# Patient Record
Sex: MALE | Race: WHITE
[De-identification: names, ages, dates, MRNs, and addresses within clinical notes are randomized per-mention and may not be internally consistent; named-entity substitution may affect disease eponyms.]

---

## 2023-02-10 ENCOUNTER — HOSPITAL ENCOUNTER (EMERGENCY)
Dept: HOSPITAL 74 - FER | Age: 53
Discharge: LEFT BEFORE DISCHARGE | End: 2023-02-10
Payer: COMMERCIAL

## 2023-02-10 VITALS — HEART RATE: 89 BPM | SYSTOLIC BLOOD PRESSURE: 143 MMHG | DIASTOLIC BLOOD PRESSURE: 89 MMHG | RESPIRATION RATE: 18 BRPM

## 2023-02-10 VITALS — TEMPERATURE: 98.2 F

## 2023-02-10 VITALS — BODY MASS INDEX: 39.7 KG/M2

## 2023-02-10 DIAGNOSIS — M54.2: Primary | ICD-10-CM

## 2023-02-17 PROBLEM — Z00.00 ENCOUNTER FOR PREVENTIVE HEALTH EXAMINATION: Status: ACTIVE | Noted: 2023-02-17

## 2023-02-20 ENCOUNTER — NON-APPOINTMENT (OUTPATIENT)
Age: 53
End: 2023-02-20

## 2023-02-23 ENCOUNTER — NON-APPOINTMENT (OUTPATIENT)
Age: 53
End: 2023-02-23

## 2023-02-23 ENCOUNTER — APPOINTMENT (OUTPATIENT)
Dept: SPINE | Facility: CLINIC | Age: 53
End: 2023-02-23
Payer: OTHER MISCELLANEOUS

## 2023-02-23 VITALS
HEART RATE: 85 BPM | BODY MASS INDEX: 39.82 KG/M2 | HEIGHT: 65 IN | DIASTOLIC BLOOD PRESSURE: 120 MMHG | OXYGEN SATURATION: 95 % | WEIGHT: 239 LBS | SYSTOLIC BLOOD PRESSURE: 169 MMHG

## 2023-02-23 DIAGNOSIS — Z87.19 PERSONAL HISTORY OF OTHER DISEASES OF THE DIGESTIVE SYSTEM: ICD-10-CM

## 2023-02-23 DIAGNOSIS — Z78.9 OTHER SPECIFIED HEALTH STATUS: ICD-10-CM

## 2023-02-23 DIAGNOSIS — M50.20 OTHER CERVICAL DISC DISPLACEMENT, UNSPECIFIED CERVICAL REGION: ICD-10-CM

## 2023-02-23 DIAGNOSIS — Z83.3 FAMILY HISTORY OF DIABETES MELLITUS: ICD-10-CM

## 2023-02-23 DIAGNOSIS — R73.03 PREDIABETES.: ICD-10-CM

## 2023-02-23 PROCEDURE — 99204 OFFICE O/P NEW MOD 45 MIN: CPT

## 2023-02-23 PROCEDURE — 99072 ADDL SUPL MATRL&STAF TM PHE: CPT

## 2023-02-23 RX ORDER — SEMAGLUTIDE 0.68 MG/ML
INJECTION, SOLUTION SUBCUTANEOUS
Refills: 0 | Status: ACTIVE | COMMUNITY

## 2023-02-23 RX ORDER — GABAPENTIN 300 MG/1
300 CAPSULE ORAL
Qty: 30 | Refills: 1 | Status: ACTIVE | COMMUNITY
Start: 2023-02-23 | End: 1900-01-01

## 2023-02-23 RX ORDER — ACETAMINOPHEN 500 MG/1
500 TABLET, COATED ORAL
Refills: 0 | Status: ACTIVE | COMMUNITY

## 2023-02-23 RX ORDER — TIZANIDINE 2 MG/1
2 TABLET ORAL
Refills: 0 | Status: ACTIVE | COMMUNITY

## 2023-02-23 RX ORDER — MELOXICAM 15 MG/1
15 TABLET ORAL
Refills: 0 | Status: ACTIVE | COMMUNITY

## 2023-02-25 PROBLEM — Z87.19 HISTORY OF HEPATIC DISEASE: Status: RESOLVED | Noted: 2023-02-23 | Resolved: 2023-02-25

## 2023-02-25 PROBLEM — Z78.9 CURRENT NON-SMOKER: Status: ACTIVE | Noted: 2023-02-23

## 2023-02-25 PROBLEM — Z83.3 FAMILY HISTORY OF DIABETES MELLITUS: Status: ACTIVE | Noted: 2023-02-23

## 2023-02-25 PROBLEM — R73.03 PRE-DIABETES: Status: RESOLVED | Noted: 2023-02-23 | Resolved: 2023-02-25

## 2023-02-25 NOTE — HISTORY OF PRESENT ILLNESS
[< 3 months] : less than 3 months [FreeTextEntry1] : neck pain with radiation into right arm [de-identified] : Mr. Dunbar is a 52 year old gentleman with PMH of prediabetes here today for a second opinion. The patient works as a  and states on 1/17/2023 he was moving an 80 pound bag of cement and placed it on his right shoulder. He immediately begun experiencing right arm pain. The pain progressed to neck pain, right  weakness and numbness.  He has done one month of physical therapy with no improvement. Pain level is 9/10. He is taking meloxicam daily, Tylenol as needed and Tizanidine with minimal relief. Denies any trouble with his balance. MRI show large C5-6 disc herniation and smaller C4-5 disc herniation with significant cord compression. Possible osteophyte formation . \par

## 2023-02-25 NOTE — REASON FOR VISIT
[New Patient Visit] : a new patient visit [Spouse] : spouse [FreeTextEntry1] : neck pain with radiation into right arm

## 2023-02-25 NOTE — ASSESSMENT
[FreeTextEntry1] : 52 year old male with cervical radiculopathy and right upper extremity weakness following a work related injury 5 weeks ago. Right bicep and  weakness noted on exam. C4-5 and C5-6 TDR with possible fusion was discussed in detail. Surgery was a result of shared decision making, the patient had no benefit from conservative- non surgical treatment, and all surgical options were reviewed. Risks related of surgery were discussed in detail - including but not limited to CSF leak, infection, nerve damage, temporary or permanent weakness or numbness, hemorrhage, cardiac events, and rarely even death, among others. Repair of CSF leak explained. An explanation of hardware/instrumentation that will be placed in the spine was reviewed and understood. Cervical CT Scan and Cervical flexion and extension scan ordered for surgical planing. Surgery and images authorization will be requested from [facility].\par \par \par I, Dr. Chava Garcia, evaluated this patient with Nurse Practitioner, Zhanna Reynolds, and established the plan of care. I personally discussed this patient during the key portions of the history and exam with the Nurse Practitioner at the time of the visit. I agree with the assessment and plan as written, unless noted below.

## 2023-02-25 NOTE — PHYSICAL EXAM
[Person] : oriented to person [Place] : oriented to place [Time] : oriented to time [Short Term Intact] : short term memory intact [Remote Intact] : remote memory intact [Span Intact] : the attention span was normal [Concentration Intact] : normal concentrating ability [Fluency] : fluency intact [Comprehension] : comprehension intact [Current Events] : adequate knowledge of current events [Past History] : adequate knowledge of personal past history [Vocabulary] : adequate range of vocabulary [Cranial Nerves Optic (II)] : visual acuity intact bilaterally,  pupils equal round and reactive to light [Cranial Nerves Oculomotor (III)] : extraocular motion intact [Cranial Nerves Trigeminal (V)] : facial sensation intact symmetrically [Cranial Nerves Facial (VII)] : face symmetrical [Cranial Nerves Vestibulocochlear (VIII)] : hearing was intact bilaterally [Cranial Nerves Glossopharyngeal (IX)] : tongue and palate midline [Cranial Nerves Accessory (XI - Cranial And Spinal)] : head turning and shoulder shrug symmetric [Cranial Nerves Hypoglossal (XII)] : there was no tongue deviation with protrusion [Motor Tone] : muscle tone was normal in all four extremities [No Muscle Atrophy] : normal bulk in all four extremities [Abnormal Walk] : normal gait [Balance] : balance was intact [2+] : Triceps left 2+ [3+] : Ankle jerk left 3+ [Spurling's - Opposite Side] : Positive Spurling's on opposite side [Spurling's Same Side] : Positive Spurling's on same side [Past-pointing] : there was no past-pointing [Tremor] : no tremor present [Plantar Reflex Right Only] : normal on the right [Plantar Reflex Left Only] : normal on the left [Kaur] : Kaur's sign was not demonstrated [FreeTextEntry6] : right bicep and  4/5 [FreeTextEntry7] : decrease light touch C6 right [L'Hermitte's] : neck flexion did not produce tingling down the spine/arms

## 2023-03-01 ENCOUNTER — APPOINTMENT (OUTPATIENT)
Dept: SPINE | Facility: HOSPITAL | Age: 53
End: 2023-03-01

## 2023-03-06 ENCOUNTER — APPOINTMENT (OUTPATIENT)
Dept: CT IMAGING | Facility: IMAGING CENTER | Age: 53
End: 2023-03-06
Payer: OTHER MISCELLANEOUS

## 2023-03-06 ENCOUNTER — OUTPATIENT (OUTPATIENT)
Dept: OUTPATIENT SERVICES | Facility: HOSPITAL | Age: 53
LOS: 1 days | End: 2023-03-06
Payer: COMMERCIAL

## 2023-03-06 DIAGNOSIS — M47.812 SPONDYLOSIS WITHOUT MYELOPATHY OR RADICULOPATHY, CERVICAL REGION: ICD-10-CM

## 2023-03-06 PROCEDURE — 72125 CT NECK SPINE W/O DYE: CPT | Mod: 26

## 2023-03-06 PROCEDURE — 72125 CT NECK SPINE W/O DYE: CPT

## 2023-03-22 ENCOUNTER — OUTPATIENT (OUTPATIENT)
Dept: OUTPATIENT SERVICES | Facility: HOSPITAL | Age: 53
LOS: 1 days | End: 2023-03-22
Payer: COMMERCIAL

## 2023-03-22 VITALS
HEIGHT: 65 IN | SYSTOLIC BLOOD PRESSURE: 134 MMHG | RESPIRATION RATE: 16 BRPM | HEART RATE: 96 BPM | OXYGEN SATURATION: 99 % | WEIGHT: 231.93 LBS | DIASTOLIC BLOOD PRESSURE: 97 MMHG | TEMPERATURE: 98 F

## 2023-03-22 DIAGNOSIS — Z29.9 ENCOUNTER FOR PROPHYLACTIC MEASURES, UNSPECIFIED: ICD-10-CM

## 2023-03-22 DIAGNOSIS — Z01.818 ENCOUNTER FOR OTHER PREPROCEDURAL EXAMINATION: ICD-10-CM

## 2023-03-22 DIAGNOSIS — Z98.890 OTHER SPECIFIED POSTPROCEDURAL STATES: Chronic | ICD-10-CM

## 2023-03-22 DIAGNOSIS — M47.812 SPONDYLOSIS WITHOUT MYELOPATHY OR RADICULOPATHY, CERVICAL REGION: ICD-10-CM

## 2023-03-22 DIAGNOSIS — G47.33 OBSTRUCTIVE SLEEP APNEA (ADULT) (PEDIATRIC): ICD-10-CM

## 2023-03-22 LAB
ANION GAP SERPL CALC-SCNC: 11 MMOL/L — SIGNIFICANT CHANGE UP (ref 5–17)
BLD GP AB SCN SERPL QL: NEGATIVE — SIGNIFICANT CHANGE UP
BUN SERPL-MCNC: 19 MG/DL — SIGNIFICANT CHANGE UP (ref 7–23)
CALCIUM SERPL-MCNC: 9.7 MG/DL — SIGNIFICANT CHANGE UP (ref 8.4–10.5)
CHLORIDE SERPL-SCNC: 103 MMOL/L — SIGNIFICANT CHANGE UP (ref 96–108)
CO2 SERPL-SCNC: 26 MMOL/L — SIGNIFICANT CHANGE UP (ref 22–31)
CREAT SERPL-MCNC: 0.91 MG/DL — SIGNIFICANT CHANGE UP (ref 0.5–1.3)
EGFR: 101 ML/MIN/1.73M2 — SIGNIFICANT CHANGE UP
GLUCOSE SERPL-MCNC: 79 MG/DL — SIGNIFICANT CHANGE UP (ref 70–99)
HCT VFR BLD CALC: 47.5 % — SIGNIFICANT CHANGE UP (ref 39–50)
HGB BLD-MCNC: 15.6 G/DL — SIGNIFICANT CHANGE UP (ref 13–17)
MCHC RBC-ENTMCNC: 30.8 PG — SIGNIFICANT CHANGE UP (ref 27–34)
MCHC RBC-ENTMCNC: 32.8 GM/DL — SIGNIFICANT CHANGE UP (ref 32–36)
MCV RBC AUTO: 93.9 FL — SIGNIFICANT CHANGE UP (ref 80–100)
NRBC # BLD: 0 /100 WBCS — SIGNIFICANT CHANGE UP (ref 0–0)
PLATELET # BLD AUTO: 237 K/UL — SIGNIFICANT CHANGE UP (ref 150–400)
POTASSIUM SERPL-MCNC: 4.2 MMOL/L — SIGNIFICANT CHANGE UP (ref 3.5–5.3)
POTASSIUM SERPL-SCNC: 4.2 MMOL/L — SIGNIFICANT CHANGE UP (ref 3.5–5.3)
RBC # BLD: 5.06 M/UL — SIGNIFICANT CHANGE UP (ref 4.2–5.8)
RBC # FLD: 13.6 % — SIGNIFICANT CHANGE UP (ref 10.3–14.5)
RH IG SCN BLD-IMP: POSITIVE — SIGNIFICANT CHANGE UP
SODIUM SERPL-SCNC: 140 MMOL/L — SIGNIFICANT CHANGE UP (ref 135–145)
WBC # BLD: 10.53 K/UL — HIGH (ref 3.8–10.5)
WBC # FLD AUTO: 10.53 K/UL — HIGH (ref 3.8–10.5)

## 2023-03-22 PROCEDURE — 83036 HEMOGLOBIN GLYCOSYLATED A1C: CPT

## 2023-03-22 PROCEDURE — 80048 BASIC METABOLIC PNL TOTAL CA: CPT

## 2023-03-22 PROCEDURE — 86901 BLOOD TYPING SEROLOGIC RH(D): CPT

## 2023-03-22 PROCEDURE — 86900 BLOOD TYPING SEROLOGIC ABO: CPT

## 2023-03-22 PROCEDURE — G0463: CPT

## 2023-03-22 PROCEDURE — 86850 RBC ANTIBODY SCREEN: CPT

## 2023-03-22 PROCEDURE — 87640 STAPH A DNA AMP PROBE: CPT

## 2023-03-22 PROCEDURE — 85027 COMPLETE CBC AUTOMATED: CPT

## 2023-03-22 PROCEDURE — 87641 MR-STAPH DNA AMP PROBE: CPT

## 2023-03-22 RX ORDER — CHLORHEXIDINE GLUCONATE 213 G/1000ML
1 SOLUTION TOPICAL ONCE
Refills: 0 | Status: DISCONTINUED | OUTPATIENT
Start: 2023-03-27 | End: 2023-03-29

## 2023-03-22 NOTE — H&P PST ADULT - NEGATIVE NEUROLOGICAL SYMPTOMS
no generalized seizures/no focal seizures/no tremors no generalized seizures/no focal seizures/no tremors/no loss of sensation/no difficulty walking/no loss of consciousness

## 2023-03-22 NOTE — H&P PST ADULT - ASSESSMENT
DASI score:  DASI activity:  Loose teeth or denture: removable partial dentures (upper & lower)    GIANFRANCOI VTE 2.0 SCORE [CLOT updated 2019]    AGE RELATED RISK FACTORS                                                       MOBILITY RELATED FACTORS  [ ] Age 41-60 years                                            (1 Point)                    [ ] Bed rest                                                        (1 Point)  [ ] Age: 61-74 years                                           (2 Points)                  [ ] Plaster cast                                                   (2 Points)  [ ] Age= 75 years                                              (3 Points)                    [ ] Bed bound for more than 72 hours                 (2 Points)    DISEASE RELATED RISK FACTORS                                               GENDER SPECIFIC FACTORS  [ ] Edema in the lower extremities                       (1 Point)              [ ] Pregnancy                                                     (1 Point)  [ ] Varicose veins                                               (1 Point)                     [ ] Post-partum < 6 weeks                                   (1 Point)             [ ] BMI > 25 Kg/m2                                            (1 Point)                     [ ] Hormonal therapy  or oral contraception          (1 Point)                 [ ] Sepsis (in the previous month)                        (1 Point)               [ ] History of pregnancy complications                 (1 point)  [ ] Pneumonia or serious lung disease                                               [ ] Unexplained or recurrent                     (1 Point)           (in the previous month)                               (1 Point)  [ ] Abnormal pulmonary function test                     (1 Point)                 SURGERY RELATED RISK FACTORS  [ ] Acute myocardial infarction                              (1 Point)               [ ]  Section                                             (1 Point)  [ ] Congestive heart failure (in the previous month)  (1 Point)      [ ] Minor surgery                                                  (1 Point)   [ ] Inflammatory bowel disease                             (1 Point)               [ ] Arthroscopic surgery                                        (2 Points)  [ ] Central venous access                                      (2 Points)                [ ] General surgery lasting more than 45 minutes (2 points)  [ ] Malignancy- Present or previous                   (2 Points)                [ ] Elective arthroplasty                                         (5 points)    [ ] Stroke (in the previous month)                          (5 Points)                                                                                                                                                           HEMATOLOGY RELATED FACTORS                                                 TRAUMA RELATED RISK FACTORS  [ ] Prior episodes of VTE                                     (3 Points)                [ ] Fracture of the hip, pelvis, or leg                       (5 Points)  [ ] Positive family history for VTE                         (3 Points)             [ ] Acute spinal cord injury (in the previous month)  (5 Points)  [ ] Prothrombin 56927 A                                     (3 Points)               [ ] Paralysis  (less than 1 month)                             (5 Points)  [ ] Factor V Leiden                                             (3 Points)                  [ ] Multiple Trauma within 1 month                        (5 Points)  [ ] Lupus anticoagulants                                     (3 Points)                                                           [ ] Anticardiolipin antibodies                               (3 Points)                                                       [ ] High homocysteine in the blood                      (3 Points)                                             [ ] Other congenital or acquired thrombophilia      (3 Points)                                                [ ] Heparin induced thrombocytopenia                  (3 Points)                                     Total Score [          ] DASI score: 6.45 mets  DASI activity: ADLs, walking, stairs, neck pain and right arm/leg numbness are limiting activity d/t discomfort  Loose teeth or denture: removable partial dentures (upper & lower), denies loose teeth    CAPRINI VTE 2.0 SCORE [CLOT updated 2019]    AGE RELATED RISK FACTORS                                                       MOBILITY RELATED FACTORS  [1 ] Age 41-60 years                                            (1 Point)                    [ ] Bed rest                                                        (1 Point)  [ ] Age: 61-74 years                                           (2 Points)                  [ ] Plaster cast                                                   (2 Points)  [ ] Age= 75 years                                              (3 Points)                    [ ] Bed bound for more than 72 hours                 (2 Points)    DISEASE RELATED RISK FACTORS                                               GENDER SPECIFIC FACTORS  [ ] Edema in the lower extremities                       (1 Point)              [ ] Pregnancy                                                     (1 Point)  [ ] Varicose veins                                               (1 Point)                     [ ] Post-partum < 6 weeks                                   (1 Point)             [1 ] BMI > 25 Kg/m2                                            (1 Point)                     [ ] Hormonal therapy  or oral contraception          (1 Point)                 [ ] Sepsis (in the previous month)                        (1 Point)               [ ] History of pregnancy complications                 (1 point)  [ ] Pneumonia or serious lung disease                                               [ ] Unexplained or recurrent                     (1 Point)           (in the previous month)                               (1 Point)  [ ] Abnormal pulmonary function test                     (1 Point)                 SURGERY RELATED RISK FACTORS  [ ] Acute myocardial infarction                              (1 Point)               [ ]  Section                                             (1 Point)  [ ] Congestive heart failure (in the previous month)  (1 Point)      [ ] Minor surgery                                                  (1 Point)   [ ] Inflammatory bowel disease                             (1 Point)               [ ] Arthroscopic surgery                                        (2 Points)  [ ] Central venous access                                      (2 Points)                [2 ] General surgery lasting more than 45 minutes (2 points)  [ ] Malignancy- Present or previous                   (2 Points)                [ ] Elective arthroplasty                                         (5 points)    [ ] Stroke (in the previous month)                          (5 Points)                                                                                                                                                           HEMATOLOGY RELATED FACTORS                                                 TRAUMA RELATED RISK FACTORS  [ ] Prior episodes of VTE                                     (3 Points)                [ ] Fracture of the hip, pelvis, or leg                       (5 Points)  [ ] Positive family history for VTE                         (3 Points)             [ ] Acute spinal cord injury (in the previous month)  (5 Points)  [ ] Prothrombin 56390 A                                     (3 Points)               [ ] Paralysis  (less than 1 month)                             (5 Points)  [ ] Factor V Leiden                                             (3 Points)                  [ ] Multiple Trauma within 1 month                        (5 Points)  [ ] Lupus anticoagulants                                     (3 Points)                                                           [ ] Anticardiolipin antibodies                               (3 Points)                                                       [ ] High homocysteine in the blood                      (3 Points)                                             [ ] Other congenital or acquired thrombophilia      (3 Points)                                                [ ] Heparin induced thrombocytopenia                  (3 Points)                                     Total Score [      4    ]

## 2023-03-22 NOTE — H&P PST ADULT - NSICDXPASTMEDICALHX_GEN_ALL_CORE_FT
PAST MEDICAL HISTORY:  2019 novel coronavirus disease (COVID-19)     Cervical disc herniation     H/O chronic hepatitis     History of prediabetes     HTN (hypertension)     Mild asthma      PAST MEDICAL HISTORY:  2019 novel coronavirus disease (COVID-19)     Cervical disc herniation     H/O chronic hepatitis     History of prediabetes     HTN (hypertension)     Mild asthma     DANIELA (obstructive sleep apnea)

## 2023-03-22 NOTE — H&P PST ADULT - PROBLEM SELECTOR PLAN 1
C4-C8 ANTERIOR CERVICAL DISCECTOMY AND FUSION  Pre-op education provided - all questions answered   Chlorhex soap & instructions given  CBC, BMP, Ha1c, T&S, MRSA/MSSA swab sent in PST

## 2023-03-22 NOTE — H&P PST ADULT - HISTORY OF PRESENT ILLNESS
1/17/2023  53 YO M PMH prediabetes, newly diagnosed HTN (recently started on daily metoprolol), asthma (has not used rescue inhaler for >1 year), chronic Hep C (treated 20+ years ago), pt works as a  and states on 1/17/2023 he was moving an 80 pound bag of cement and placed it on his right shoulder and immediately began experiencing right arm pain that progressed to neck pain, right  weakness and numbness, MRI revealed large C5-6 disc herniation and smaller C4-5 disc herniation with significant cord compression, possible osteophyte formation, presents to PST for C4-C8 ANTERIOR CERVICAL DISCECTOMY AND FUSION 3/27/2023. Denies any palpitations, SOB, N/V, fever or chills.      ***Covid test scheduled for 3/24/2023 at [facility]. 51 YO M PMH prediabetes, newly diagnosed HTN (recently started on daily metoprolol), asthma (has not used rescue inhaler for >1 year), DANIELA (by criteria, Stop Bang 5), chronic Hep C (treated 20+ years ago), pt works as a  and states on 1/17/2023 he was moving an 80 pound bag of cement and placed it on his right shoulder and immediately began experiencing right arm pain that progressed to neck pain, right  weakness and numbness, MRI revealed large C5-6 disc herniation and smaller C4-5 disc herniation with significant cord compression, possible osteophyte formation, presents to PST for C4-C8 ANTERIOR CERVICAL DISCECTOMY AND FUSION 3/27/2023. Denies any palpitations, SOB, N/V, fever or chills.      ***Covid test scheduled for 3/24/2023 at [facility].

## 2023-03-23 LAB
A1C WITH ESTIMATED AVERAGE GLUCOSE RESULT: 5.8 % — HIGH (ref 4–5.6)
ESTIMATED AVERAGE GLUCOSE: 120 MG/DL — HIGH (ref 68–114)
MRSA PCR RESULT.: SIGNIFICANT CHANGE UP
S AUREUS DNA NOSE QL NAA+PROBE: SIGNIFICANT CHANGE UP

## 2023-03-24 ENCOUNTER — OUTPATIENT (OUTPATIENT)
Dept: OUTPATIENT SERVICES | Facility: HOSPITAL | Age: 53
LOS: 1 days | End: 2023-03-24
Payer: COMMERCIAL

## 2023-03-24 DIAGNOSIS — Z98.890 OTHER SPECIFIED POSTPROCEDURAL STATES: Chronic | ICD-10-CM

## 2023-03-24 DIAGNOSIS — Z11.52 ENCOUNTER FOR SCREENING FOR COVID-19: ICD-10-CM

## 2023-03-24 LAB — SARS-COV-2 RNA SPEC QL NAA+PROBE: SIGNIFICANT CHANGE UP

## 2023-03-24 PROCEDURE — U0003: CPT

## 2023-03-24 PROCEDURE — C9803: CPT

## 2023-03-24 PROCEDURE — U0005: CPT

## 2023-03-26 ENCOUNTER — TRANSCRIPTION ENCOUNTER (OUTPATIENT)
Age: 53
End: 2023-03-26

## 2023-03-27 ENCOUNTER — TRANSCRIPTION ENCOUNTER (OUTPATIENT)
Age: 53
End: 2023-03-27

## 2023-03-27 ENCOUNTER — INPATIENT (INPATIENT)
Facility: HOSPITAL | Age: 53
LOS: 1 days | Discharge: HOME | DRG: 472 | End: 2023-03-29
Attending: NEUROLOGICAL SURGERY | Admitting: NEUROLOGICAL SURGERY
Payer: COMMERCIAL

## 2023-03-27 VITALS
DIASTOLIC BLOOD PRESSURE: 83 MMHG | RESPIRATION RATE: 16 BRPM | OXYGEN SATURATION: 97 % | HEIGHT: 65 IN | TEMPERATURE: 98 F | SYSTOLIC BLOOD PRESSURE: 122 MMHG | WEIGHT: 231.93 LBS | HEART RATE: 82 BPM

## 2023-03-27 DIAGNOSIS — Z98.890 OTHER SPECIFIED POSTPROCEDURAL STATES: Chronic | ICD-10-CM

## 2023-03-27 DIAGNOSIS — M47.812 SPONDYLOSIS WITHOUT MYELOPATHY OR RADICULOPATHY, CERVICAL REGION: ICD-10-CM

## 2023-03-27 LAB
GAS PNL BLDA: SIGNIFICANT CHANGE UP
GLUCOSE BLDC GLUCOMTR-MCNC: 107 MG/DL — HIGH (ref 70–99)
GLUCOSE BLDC GLUCOMTR-MCNC: 119 MG/DL — HIGH (ref 70–99)
GLUCOSE BLDC GLUCOMTR-MCNC: 146 MG/DL — HIGH (ref 70–99)
RH IG SCN BLD-IMP: POSITIVE — SIGNIFICANT CHANGE UP

## 2023-03-27 DEVICE — LIGATING CLIPS WECK HEMOCLIP TITANIUM MEDIUM (BLUE) 25: Type: IMPLANTABLE DEVICE | Status: FUNCTIONAL

## 2023-03-27 DEVICE — KIT A-LINE 1LUM 20G X 12CM SAFE KIT: Type: IMPLANTABLE DEVICE | Status: FUNCTIONAL

## 2023-03-27 DEVICE — CAGE TRITANIUM C ANT 6 DEG 5X14X17MM: Type: IMPLANTABLE DEVICE | Status: FUNCTIONAL

## 2023-03-27 DEVICE — CAGE TRITANIUM C ANT CERV 6 DEG 7X14X17MM: Type: IMPLANTABLE DEVICE | Status: FUNCTIONAL

## 2023-03-27 DEVICE — SCREW VAR SELF TAP 4X14MM: Type: IMPLANTABLE DEVICE | Status: FUNCTIONAL

## 2023-03-27 DEVICE — PIN PRE-FIXATION: Type: IMPLANTABLE DEVICE | Status: FUNCTIONAL

## 2023-03-27 DEVICE — IMPLANTABLE DEVICE: Type: IMPLANTABLE DEVICE | Status: FUNCTIONAL

## 2023-03-27 DEVICE — SURGIFLO MATRIX WITH THROMBIN KIT: Type: IMPLANTABLE DEVICE | Status: FUNCTIONAL

## 2023-03-27 DEVICE — SURGIFOAM PAD 8CM X 12.5CM X 10MM (100): Type: IMPLANTABLE DEVICE | Status: FUNCTIONAL

## 2023-03-27 RX ORDER — METOPROLOL TARTRATE 50 MG
1 TABLET ORAL
Qty: 0 | Refills: 0 | DISCHARGE

## 2023-03-27 RX ORDER — OXYCODONE HYDROCHLORIDE 5 MG/1
5 TABLET ORAL ONCE
Refills: 0 | Status: DISCONTINUED | OUTPATIENT
Start: 2023-03-27 | End: 2023-03-27

## 2023-03-27 RX ORDER — DEXAMETHASONE 0.5 MG/5ML
4 ELIXIR ORAL EVERY 6 HOURS
Refills: 0 | Status: DISCONTINUED | OUTPATIENT
Start: 2023-03-27 | End: 2023-03-28

## 2023-03-27 RX ORDER — ACETAMINOPHEN 500 MG
1000 TABLET ORAL ONCE
Refills: 0 | Status: DISCONTINUED | OUTPATIENT
Start: 2023-03-27 | End: 2023-03-27

## 2023-03-27 RX ORDER — DEXTROSE 50 % IN WATER 50 %
25 SYRINGE (ML) INTRAVENOUS ONCE
Refills: 0 | Status: DISCONTINUED | OUTPATIENT
Start: 2023-03-27 | End: 2023-03-29

## 2023-03-27 RX ORDER — SODIUM CHLORIDE 9 MG/ML
1000 INJECTION, SOLUTION INTRAVENOUS
Refills: 0 | Status: DISCONTINUED | OUTPATIENT
Start: 2023-03-27 | End: 2023-03-29

## 2023-03-27 RX ORDER — LIDOCAINE HCL 20 MG/ML
0.2 VIAL (ML) INJECTION ONCE
Refills: 0 | Status: DISCONTINUED | OUTPATIENT
Start: 2023-03-27 | End: 2023-03-27

## 2023-03-27 RX ORDER — METOPROLOL TARTRATE 50 MG
5 TABLET ORAL ONCE
Refills: 0 | Status: COMPLETED | OUTPATIENT
Start: 2023-03-27 | End: 2023-03-27

## 2023-03-27 RX ORDER — HYDROMORPHONE HYDROCHLORIDE 2 MG/ML
0.5 INJECTION INTRAMUSCULAR; INTRAVENOUS; SUBCUTANEOUS
Refills: 0 | Status: DISCONTINUED | OUTPATIENT
Start: 2023-03-27 | End: 2023-03-27

## 2023-03-27 RX ORDER — ALBUTEROL 90 UG/1
2 AEROSOL, METERED ORAL EVERY 6 HOURS
Refills: 0 | Status: DISCONTINUED | OUTPATIENT
Start: 2023-03-27 | End: 2023-03-29

## 2023-03-27 RX ORDER — ENOXAPARIN SODIUM 100 MG/ML
40 INJECTION SUBCUTANEOUS EVERY 24 HOURS
Refills: 0 | Status: DISCONTINUED | OUTPATIENT
Start: 2023-03-28 | End: 2023-03-29

## 2023-03-27 RX ORDER — SEMAGLUTIDE 0.68 MG/ML
0 INJECTION, SOLUTION SUBCUTANEOUS
Qty: 0 | Refills: 0 | DISCHARGE

## 2023-03-27 RX ORDER — INSULIN LISPRO 100/ML
VIAL (ML) SUBCUTANEOUS
Refills: 0 | Status: DISCONTINUED | OUTPATIENT
Start: 2023-03-27 | End: 2023-03-29

## 2023-03-27 RX ORDER — PANTOPRAZOLE SODIUM 20 MG/1
40 TABLET, DELAYED RELEASE ORAL
Refills: 0 | Status: DISCONTINUED | OUTPATIENT
Start: 2023-03-27 | End: 2023-03-29

## 2023-03-27 RX ORDER — ONDANSETRON 8 MG/1
4 TABLET, FILM COATED ORAL ONCE
Refills: 0 | Status: DISCONTINUED | OUTPATIENT
Start: 2023-03-27 | End: 2023-03-27

## 2023-03-27 RX ORDER — SODIUM CHLORIDE 9 MG/ML
500 INJECTION, SOLUTION INTRAVENOUS ONCE
Refills: 0 | Status: COMPLETED | OUTPATIENT
Start: 2023-03-27 | End: 2023-03-27

## 2023-03-27 RX ORDER — CEFAZOLIN SODIUM 1 G
2000 VIAL (EA) INJECTION EVERY 8 HOURS
Refills: 0 | Status: COMPLETED | OUTPATIENT
Start: 2023-03-27 | End: 2023-03-28

## 2023-03-27 RX ORDER — DEXTROSE 50 % IN WATER 50 %
12.5 SYRINGE (ML) INTRAVENOUS ONCE
Refills: 0 | Status: DISCONTINUED | OUTPATIENT
Start: 2023-03-27 | End: 2023-03-29

## 2023-03-27 RX ORDER — ONDANSETRON 8 MG/1
4 TABLET, FILM COATED ORAL EVERY 6 HOURS
Refills: 0 | Status: DISCONTINUED | OUTPATIENT
Start: 2023-03-27 | End: 2023-03-29

## 2023-03-27 RX ORDER — OXYCODONE HYDROCHLORIDE 5 MG/1
10 TABLET ORAL ONCE
Refills: 0 | Status: DISCONTINUED | OUTPATIENT
Start: 2023-03-27 | End: 2023-03-27

## 2023-03-27 RX ORDER — OXYCODONE HYDROCHLORIDE 5 MG/1
10 TABLET ORAL EVERY 4 HOURS
Refills: 0 | Status: DISCONTINUED | OUTPATIENT
Start: 2023-03-27 | End: 2023-03-29

## 2023-03-27 RX ORDER — CEFAZOLIN SODIUM 1 G
2000 VIAL (EA) INJECTION ONCE
Refills: 0 | Status: COMPLETED | OUTPATIENT
Start: 2023-03-27 | End: 2023-03-27

## 2023-03-27 RX ORDER — ACETAMINOPHEN 500 MG
650 TABLET ORAL EVERY 6 HOURS
Refills: 0 | Status: DISCONTINUED | OUTPATIENT
Start: 2023-03-27 | End: 2023-03-29

## 2023-03-27 RX ORDER — SENNA PLUS 8.6 MG/1
2 TABLET ORAL AT BEDTIME
Refills: 0 | Status: DISCONTINUED | OUTPATIENT
Start: 2023-03-27 | End: 2023-03-29

## 2023-03-27 RX ORDER — SODIUM CHLORIDE 9 MG/ML
3 INJECTION INTRAMUSCULAR; INTRAVENOUS; SUBCUTANEOUS EVERY 8 HOURS
Refills: 0 | Status: DISCONTINUED | OUTPATIENT
Start: 2023-03-27 | End: 2023-03-27

## 2023-03-27 RX ORDER — METOPROLOL TARTRATE 50 MG
25 TABLET ORAL DAILY
Refills: 0 | Status: DISCONTINUED | OUTPATIENT
Start: 2023-03-27 | End: 2023-03-29

## 2023-03-27 RX ORDER — OXYCODONE HYDROCHLORIDE 5 MG/1
5 TABLET ORAL EVERY 4 HOURS
Refills: 0 | Status: DISCONTINUED | OUTPATIENT
Start: 2023-03-27 | End: 2023-03-29

## 2023-03-27 RX ORDER — DEXTROSE 50 % IN WATER 50 %
15 SYRINGE (ML) INTRAVENOUS ONCE
Refills: 0 | Status: DISCONTINUED | OUTPATIENT
Start: 2023-03-27 | End: 2023-03-29

## 2023-03-27 RX ORDER — INSULIN LISPRO 100/ML
VIAL (ML) SUBCUTANEOUS AT BEDTIME
Refills: 0 | Status: DISCONTINUED | OUTPATIENT
Start: 2023-03-27 | End: 2023-03-29

## 2023-03-27 RX ORDER — GLUCAGON INJECTION, SOLUTION 0.5 MG/.1ML
1 INJECTION, SOLUTION SUBCUTANEOUS ONCE
Refills: 0 | Status: DISCONTINUED | OUTPATIENT
Start: 2023-03-27 | End: 2023-03-29

## 2023-03-27 RX ORDER — SODIUM CHLORIDE 9 MG/ML
1000 INJECTION INTRAMUSCULAR; INTRAVENOUS; SUBCUTANEOUS
Refills: 0 | Status: DISCONTINUED | OUTPATIENT
Start: 2023-03-27 | End: 2023-03-29

## 2023-03-27 RX ADMIN — HYDROMORPHONE HYDROCHLORIDE 0.5 MILLIGRAM(S): 2 INJECTION INTRAMUSCULAR; INTRAVENOUS; SUBCUTANEOUS at 17:30

## 2023-03-27 RX ADMIN — HYDROMORPHONE HYDROCHLORIDE 0.5 MILLIGRAM(S): 2 INJECTION INTRAMUSCULAR; INTRAVENOUS; SUBCUTANEOUS at 16:10

## 2023-03-27 RX ADMIN — Medication 5 MILLIGRAM(S): at 17:42

## 2023-03-27 RX ADMIN — HYDROMORPHONE HYDROCHLORIDE 0.5 MILLIGRAM(S): 2 INJECTION INTRAMUSCULAR; INTRAVENOUS; SUBCUTANEOUS at 18:45

## 2023-03-27 RX ADMIN — Medication 100 MILLIGRAM(S): at 21:25

## 2023-03-27 RX ADMIN — OXYCODONE HYDROCHLORIDE 5 MILLIGRAM(S): 5 TABLET ORAL at 22:00

## 2023-03-27 RX ADMIN — OXYCODONE HYDROCHLORIDE 10 MILLIGRAM(S): 5 TABLET ORAL at 20:30

## 2023-03-27 RX ADMIN — HYDROMORPHONE HYDROCHLORIDE 0.5 MILLIGRAM(S): 2 INJECTION INTRAMUSCULAR; INTRAVENOUS; SUBCUTANEOUS at 15:00

## 2023-03-27 RX ADMIN — HYDROMORPHONE HYDROCHLORIDE 0.5 MILLIGRAM(S): 2 INJECTION INTRAMUSCULAR; INTRAVENOUS; SUBCUTANEOUS at 17:45

## 2023-03-27 RX ADMIN — OXYCODONE HYDROCHLORIDE 10 MILLIGRAM(S): 5 TABLET ORAL at 23:00

## 2023-03-27 RX ADMIN — SODIUM CHLORIDE 75 MILLILITER(S): 9 INJECTION INTRAMUSCULAR; INTRAVENOUS; SUBCUTANEOUS at 15:35

## 2023-03-27 RX ADMIN — SODIUM CHLORIDE 1000 MILLILITER(S): 9 INJECTION, SOLUTION INTRAVENOUS at 16:39

## 2023-03-27 RX ADMIN — OXYCODONE HYDROCHLORIDE 10 MILLIGRAM(S): 5 TABLET ORAL at 23:23

## 2023-03-27 RX ADMIN — OXYCODONE HYDROCHLORIDE 5 MILLIGRAM(S): 5 TABLET ORAL at 21:24

## 2023-03-27 RX ADMIN — OXYCODONE HYDROCHLORIDE 10 MILLIGRAM(S): 5 TABLET ORAL at 19:54

## 2023-03-27 RX ADMIN — SENNA PLUS 2 TABLET(S): 8.6 TABLET ORAL at 21:25

## 2023-03-27 RX ADMIN — HYDROMORPHONE HYDROCHLORIDE 0.5 MILLIGRAM(S): 2 INJECTION INTRAMUSCULAR; INTRAVENOUS; SUBCUTANEOUS at 18:30

## 2023-03-27 RX ADMIN — Medication 4 MILLIGRAM(S): at 17:43

## 2023-03-27 RX ADMIN — HYDROMORPHONE HYDROCHLORIDE 0.5 MILLIGRAM(S): 2 INJECTION INTRAMUSCULAR; INTRAVENOUS; SUBCUTANEOUS at 15:55

## 2023-03-27 RX ADMIN — Medication 4 MILLIGRAM(S): at 23:23

## 2023-03-27 RX ADMIN — HYDROMORPHONE HYDROCHLORIDE 0.5 MILLIGRAM(S): 2 INJECTION INTRAMUSCULAR; INTRAVENOUS; SUBCUTANEOUS at 14:45

## 2023-03-27 NOTE — PHYSICAL THERAPY INITIAL EVALUATION ADULT - PERTINENT HX OF CURRENT PROBLEM, REHAB EVAL
53 YO M PMH prediabetes, newly diagnosed HTN (recently started on daily metoprolol), asthma (has not used rescue inhaler for >1 year), DANIELA (by criteria, Stop Bang 5), chronic Hep C (treated 20+ years ago), pt works as a  and states on 1/17/2023 he was moving an 80 pound bag of cement and placed it on his right shoulder and immediately began experiencing right arm pain that progressed to neck pain, right  weakness and numbness, MRI revealed large C5-6 disc herniation and smaller C4-5 disc herniation with significant cord compression, possible osteophyte formation, presents to PST for C4-C8 ANTERIOR CERVICAL DISCECTOMY AND FUSION 3/27/2023. Denies any palpitations, SOB, N/V, fever or chills.

## 2023-03-27 NOTE — PHYSICAL THERAPY INITIAL EVALUATION ADULT - GENERAL OBSERVATIONS, REHAB EVAL
Rec'd semisupine in bed, in NAD, +IV, +a-line, +HMV, +benton, +PACU monitoring. Agreeable to PT. RN cleared pt to be seen.

## 2023-03-27 NOTE — OCCUPATIONAL THERAPY INITIAL EVALUATION ADULT - LIVES WITH, PROFILE
Pt lives in a house with 0 ARY. Pt's bedroom is located on the second floor with 18 ARY with handrails. Pt has a tub-shower and lives with his wife./spouse

## 2023-03-27 NOTE — OCCUPATIONAL THERAPY INITIAL EVALUATION ADULT - NSOTDISCHREC_GEN_A_CORE
If pt goes home, OT rec home OT and assist with all ADLs and mobility/Acute Inpatient Rehab TBD pending further functional mobility

## 2023-03-27 NOTE — PHYSICAL THERAPY INITIAL EVALUATION ADULT - ADDITIONAL COMMENTS
per pt, he lives in a house with wife. Has no steps to enter and 18 steps inside (+HR). Reports being independent with functional mobility/ADLs without AD. +work

## 2023-03-27 NOTE — OCCUPATIONAL THERAPY INITIAL EVALUATION ADULT - PERTINENT HX OF CURRENT PROBLEM, REHAB EVAL
51 YO M PMH prediabetes, newly diagnosed HTN (recently started on daily metoprolol), asthma (has not used rescue inhaler for >1 year), DANIELA (by criteria, Stop Bang 5), chronic Hep C (treated 20+ years ago), pt works as a  and states on 1/17/2023 he was moving an 80 pound bag of cement and placed it on his right shoulder and immediately began experiencing right arm pain that progressed to neck pain, right  weakness and numbness, MRI revealed large C5-6 disc herniation and smaller C4-5 disc herniation with significant cord compression, possible osteophyte formation. Pt is now s/p C4-C8 ANTERIOR CERVICAL DISCECTOMY AND FUSION 3/27/2023.

## 2023-03-27 NOTE — OCCUPATIONAL THERAPY INITIAL EVALUATION ADULT - GENERAL OBSERVATIONS, REHAB EVAL
Pt was received semi-supine in bed, +JOSH drain x1, +BP cuff, +tele, +pulse ox Pt was received semi-supine in bed, +JOSH drain x1, +BP cuff, +tele, +pulse ox, +benton, +a-line Pt was received semi-supine in bed, +hvac x1, +BP cuff, +tele, +pulse ox, +benton, +a-line

## 2023-03-27 NOTE — PATIENT PROFILE ADULT - FALL HARM RISK - UNIVERSAL INTERVENTIONS
Bed in lowest position, wheels locked, appropriate side rails in place/Call bell, personal items and telephone in reach/Instruct patient to call for assistance before getting out of bed or chair/Non-slip footwear when patient is out of bed/[place] to call system/Physically safe environment - no spills, clutter or unnecessary equipment/Purposeful Proactive Rounding/Room/bathroom lighting operational, light cord in reach

## 2023-03-28 LAB
GLUCOSE BLDC GLUCOMTR-MCNC: 121 MG/DL — HIGH (ref 70–99)
GLUCOSE BLDC GLUCOMTR-MCNC: 134 MG/DL — HIGH (ref 70–99)
GLUCOSE BLDC GLUCOMTR-MCNC: 142 MG/DL — HIGH (ref 70–99)
GLUCOSE BLDC GLUCOMTR-MCNC: 156 MG/DL — HIGH (ref 70–99)

## 2023-03-28 RX ORDER — DEXAMETHASONE 0.5 MG/5ML
4 ELIXIR ORAL EVERY 8 HOURS
Refills: 0 | Status: DISCONTINUED | OUTPATIENT
Start: 2023-03-28 | End: 2023-03-29

## 2023-03-28 RX ORDER — METHOCARBAMOL 500 MG/1
500 TABLET, FILM COATED ORAL EVERY 8 HOURS
Refills: 0 | Status: DISCONTINUED | OUTPATIENT
Start: 2023-03-28 | End: 2023-03-29

## 2023-03-28 RX ORDER — ACETAMINOPHEN 500 MG
1000 TABLET ORAL ONCE
Refills: 0 | Status: COMPLETED | OUTPATIENT
Start: 2023-03-28 | End: 2023-03-28

## 2023-03-28 RX ORDER — POLYETHYLENE GLYCOL 3350 17 G/17G
17 POWDER, FOR SOLUTION ORAL
Refills: 0 | Status: DISCONTINUED | OUTPATIENT
Start: 2023-03-28 | End: 2023-03-29

## 2023-03-28 RX ADMIN — Medication 4 MILLIGRAM(S): at 05:47

## 2023-03-28 RX ADMIN — OXYCODONE HYDROCHLORIDE 10 MILLIGRAM(S): 5 TABLET ORAL at 11:17

## 2023-03-28 RX ADMIN — Medication 4 MILLIGRAM(S): at 11:09

## 2023-03-28 RX ADMIN — Medication 2: at 12:37

## 2023-03-28 RX ADMIN — Medication 1000 MILLIGRAM(S): at 09:46

## 2023-03-28 RX ADMIN — POLYETHYLENE GLYCOL 3350 17 GRAM(S): 17 POWDER, FOR SOLUTION ORAL at 18:18

## 2023-03-28 RX ADMIN — Medication 25 MILLIGRAM(S): at 05:47

## 2023-03-28 RX ADMIN — METHOCARBAMOL 500 MILLIGRAM(S): 500 TABLET, FILM COATED ORAL at 13:04

## 2023-03-28 RX ADMIN — PANTOPRAZOLE SODIUM 40 MILLIGRAM(S): 20 TABLET, DELAYED RELEASE ORAL at 05:48

## 2023-03-28 RX ADMIN — Medication 100 MILLIGRAM(S): at 05:47

## 2023-03-28 RX ADMIN — OXYCODONE HYDROCHLORIDE 10 MILLIGRAM(S): 5 TABLET ORAL at 03:40

## 2023-03-28 RX ADMIN — OXYCODONE HYDROCHLORIDE 5 MILLIGRAM(S): 5 TABLET ORAL at 05:46

## 2023-03-28 RX ADMIN — METHOCARBAMOL 500 MILLIGRAM(S): 500 TABLET, FILM COATED ORAL at 21:44

## 2023-03-28 RX ADMIN — Medication 400 MILLIGRAM(S): at 09:16

## 2023-03-28 RX ADMIN — OXYCODONE HYDROCHLORIDE 10 MILLIGRAM(S): 5 TABLET ORAL at 10:17

## 2023-03-28 RX ADMIN — ENOXAPARIN SODIUM 40 MILLIGRAM(S): 100 INJECTION SUBCUTANEOUS at 21:44

## 2023-03-28 RX ADMIN — Medication 4 MILLIGRAM(S): at 18:18

## 2023-03-28 NOTE — CHART NOTE - NSCHARTNOTEFT_GEN_A_CORE
MORRIS [id number]      Drain type: []SD []SG [] JOSH [X] HMV [] Lumbar drain [] EVD [] ICP [place] [] Abd drain     Patient's position while drain removed: Sitting in Chair    [X] Patient tolerated well [] No complications [] complications:     Exit Site secured with: [] __ staples [] __ suture (please specify how many of each)     Additional Info: After d/w Dr Bautista this AM the Lt ACDF HMV was removed
CAPRINI SCORE [CLOT] Score on Admission for     AGE RELATED RISK FACTORS                                                       MOBILITY RELATED FACTORS  [X ] Age 41-60 years                                            (1 Point)                  [ ] Bed rest                                                        (1 Point)  [ ] Age: 61-74 years                                           (2 Points)                 [ ] Plaster cast                                                   (2 Points)  [ ] Age= 75 years                                              (3 Points)                 [ ] Bed bound for more than 72 hours                 (2 Points)    DISEASE RELATED RISK FACTORS                                               GENDER SPECIFIC FACTORS  [ ] Edema in the lower extremities                       (1 Point)                  [ ] Pregnancy                                                     (1 Point)  [ ] Varicose veins                                               (1 Point)                  [ ] Post-partum < 6 weeks                                   (1 Point)             [ X] BMI > 25 Kg/m2                                            (1 Point)                  [ ] Hormonal therapy  or oral contraception          (1 Point)                 [ ] Sepsis (in the previous month)                        (1 Point)                  [ ] History of pregnancy complications                 (1 point)  [ ] Pneumonia or serious lung disease                                               [ ] Unexplained or recurrent                     (1 Point)           (in the previous month)                               (1 Point)  [ ] Abnormal pulmonary function test                     (1 Point)                 SURGERY RELATED RISK FACTORS (include planned surgeries)  [ ] Acute myocardial infarction                              (1 Point)                 [ ]  Section                                             (1 Point)  [ ] Congestive heart failure (in the previous month)  (1 Point)         [ ] Minor surgery                                                  (1 Point)   [ ] Inflammatory bowel disease                             (1 Point)                 [ ] Arthroscopic surgery                                        (2 Points)  [ ] Central venous access                                      (2 Points)                [ ] General surgery lasting more than 45 minutes   (2 Points)       [ ] Stroke (in the previous month)                          (5 Points)               [ ] Elective arthroplasty                                         (5 Points)            [ ] current or past malignancy                              (2 Points)                                                                                                       HEMATOLOGY RELATED FACTORS                                                 TRAUMA RELATED RISK FACTORS  [ ] Prior episodes of VTE                                     (3 Points)                [ ] Fracture of the hip, pelvis, or leg                       (5 Points)  [ ] Positive family history for VTE                         (3 Points)                 [ X] Acute spinal cord injury (in the previous month)  (5 Points)  [ ] Prothrombin 81336 A                                     (3 Points)                 [ ] Paralysis  (less than 1 month)                             (5 Points)  [ ] Factor V Leiden                                             (3 Points)                  [ ] Multiple Trauma within 1 month                        (5 Points)  [ ] Lupus anticoagulants                                     (3 Points)                                                           [ ] Anticardiolipin antibodies                               (3 Points)                                                       [ ] High homocysteine in the blood                      (3 Points)                                             [ ] Other congenital or acquired thrombophilia      (3 Points)                                                [ ] Heparin induced thrombocytopenia                  (3 Points)                                          Total Score [     4     ]    Risk:  Very low 0   Low 1 to 2   Moderate 3 to 4   High =5       VTE Prophylasix Recommednations:  [ X] mechanical pneumatic compression devices                                      [ ] contraindicated: _____________________  [X ] chemo prophylasix                                                                                   [ ] contraindicated _____________________    **** HIGH LIKELIHOOD DVT PRESENT ON ADMISSION  [ ] (please order LE dopplers within 24 hours of admission)

## 2023-03-28 NOTE — PROGRESS NOTE ADULT - SUBJECTIVE AND OBJECTIVE BOX
SUBJECTIVE: This is my initial visit with this pt. Seen sitting in chair this AM. Little dizzy OOB, Mannie PO but some soarthroat. NAD    OVERNIGHT EVENTS: None    Vital Signs Last 24 Hrs  T(C): 36.3 (28 Mar 2023 08:13), Max: 36.8 (27 Mar 2023 23:30)  T(F): 97.3 (28 Mar 2023 08:13), Max: 98.3 (27 Mar 2023 23:30)  HR: 96 (28 Mar 2023 08:13) (96 - 122)  BP: 123/78 (28 Mar 2023 08:13) (106/75 - 133/84)  BP(mean): 103 (27 Mar 2023 21:00) (86 - 103)  RR: 16 (28 Mar 2023 08:13) (12 - 18)  SpO2: 92% (28 Mar 2023 08:13) (92% - 100%)    Parameters below as of 28 Mar 2023 08:13  Patient On (Oxygen Delivery Method): room air      IVF: [ ] IVL [ ] NS+K@   DIET: [ ] Regular [ ] CCD [ ] Renal [ ] Puree [ ] Dysphagia [ ] Tube Feeds:   PCA: [ ] YES [ ] NO   VARGAS: [ ] YES [ ] NO [ ] VOID   BM: [ ] YES [ ] NO     DRAINS: [ ] JOSH (cc/24h) [ ] HMV (cc/24h)    PHYSICAL EXAM:    Constitutional: No Acute Distress     Neurological: AOx3, Following Commands, Moving all Extremities     Motor exam:          Upper extremity                         Delt     Bicep     Tricep    HG                                                 R         5/5        5/5        5/5       5/5                                               L          5/5        5/5        5/5       5/5          Lower extremity                        HF         KF        KE       DF         PF                                                  R        5/5        5/5        5/5       5/5         5/5                                               L         5/5        5/5       5/5       5/5          5/5                                                 Sensation: [] intact to light touch  [] decreased:     Pulmonary: Clear to Auscultation, No rales, No rhonchi, No wheezes     Cardiovascular: S1, S2, Regular rate and rhythm     Gastrointestinal: Soft, Non-tender, Non-distended     Extremities: No calf tenderness     Incision:     LABS:         IMAGING:    MEDICATIONS  (STANDING):  chlorhexidine 2% Cloths 1 Application(s) Topical once  dexAMETHasone     Tablet 4 milliGRAM(s) Oral every 6 hours  dextrose 5%. 1000 milliLiter(s) (100 mL/Hr) IV Continuous <Continuous>  dextrose 5%. 1000 milliLiter(s) (50 mL/Hr) IV Continuous <Continuous>  dextrose 50% Injectable 25 Gram(s) IV Push once  dextrose 50% Injectable 12.5 Gram(s) IV Push once  dextrose 50% Injectable 25 Gram(s) IV Push once  enoxaparin Injectable 40 milliGRAM(s) SubCutaneous every 24 hours  glucagon  Injectable 1 milliGRAM(s) IntraMuscular once  insulin lispro (ADMELOG) corrective regimen sliding scale   SubCutaneous three times a day before meals  insulin lispro (ADMELOG) corrective regimen sliding scale   SubCutaneous at bedtime  methocarbamol 500 milliGRAM(s) Oral every 8 hours  metoprolol tartrate 25 milliGRAM(s) Oral daily  pantoprazole    Tablet 40 milliGRAM(s) Oral before breakfast  senna 2 Tablet(s) Oral at bedtime  sodium chloride 0.9%. 1000 milliLiter(s) (75 mL/Hr) IV Continuous <Continuous>    MEDICATIONS  (PRN):  acetaminophen     Tablet .. 650 milliGRAM(s) Oral every 6 hours PRN Temp greater or equal to 38C (100.4F), Mild Pain (1 - 3)  albuterol    90 MICROgram(s) HFA Inhaler 2 Puff(s) Inhalation every 6 hours PRN Bronchospasm  dextrose Oral Gel 15 Gram(s) Oral once PRN Blood Glucose LESS THAN 70 milliGRAM(s)/deciliter  ondansetron Injectable 4 milliGRAM(s) IV Push every 6 hours PRN Nausea and/or Vomiting  oxyCODONE    IR 5 milliGRAM(s) Oral every 4 hours PRN Moderate Pain (4 - 6)  oxyCODONE    IR 10 milliGRAM(s) Oral every 4 hours PRN Severe Pain (7 - 10)   SUBJECTIVE: This is my initial visit with this pt. Seen sitting in chair this AM. Little dizzy OOB, Mannie PO but some soarthroat. NAD    OVERNIGHT EVENTS: None    Vital Signs Last 24 Hrs  T(C): 36.3 (28 Mar 2023 08:13), Max: 36.8 (27 Mar 2023 23:30)  T(F): 97.3 (28 Mar 2023 08:13), Max: 98.3 (27 Mar 2023 23:30)  HR: 96 (28 Mar 2023 08:13) (96 - 122)  BP: 123/78 (28 Mar 2023 08:13) (106/75 - 133/84)  BP(mean): 103 (27 Mar 2023 21:00) (86 - 103)  RR: 16 (28 Mar 2023 08:13) (12 - 18)  SpO2: 92% (28 Mar 2023 08:13) (92% - 100%)    Parameters below as of 28 Mar 2023 08:13  Patient On (Oxygen Delivery Method): room air      IVF: [ ] IVL [X ] NS 75/H  DIET: [ ] Regular [X ] CCD [ ] Renal [ ] Puree [ ] Dysphagia [ ] Tube Feeds:   PCA: [ ] YES [ X] NO   VARGAS: [X ] YES [ ] NO [ ] VOID DC'd 3/28  BM: [ ] YES [X ] NO     DRAINS: [ ] JOSH (cc/24h) [X ] HMV 18 (cc/24h)    PHYSICAL EXAM:    Constitutional: No Acute Distress     Neurological: AAOx3, Following Commands, Moving all Extremities     Motor exam:          Upper extremity                         Delt     Bicep     Tricep    HG                                                 R         5/5        5/5        5/5       5/5                                               L          5/5        5/5        5/5       5/5          Lower extremity                        HF         KF        KE       DF         PF                                                  R        5/5        5/5        5/5       5/5         5/5                                               L         5/5        5/5       5/5       5/5          5/5                                                 Sensation: [X] intact to light touch  [] decreased:     Pulmonary: Clear to Auscultation, No rales, No rhonchi, No wheezes     Cardiovascular: S1, S2, Regular rate and rhythm     Gastrointestinal: Soft, Non-tender, Non-distended     Extremities: No calf tenderness     Incision: HMV active, Dermabond-CDI/Flat. No dysphagia/dysphonia    LABS:    CBC, BMP in AM FU    COVID-19 PCR: Brando (24 Mar 2023 16:34)    IMAGING:    MEDICATIONS  (STANDING):  chlorhexidine 2% Cloths 1 Application(s) Topical once  dexAMETHasone     Tablet 4 milliGRAM(s) Oral every 6 hours  dextrose 5%. 1000 milliLiter(s) (100 mL/Hr) IV Continuous <Continuous>  dextrose 5%. 1000 milliLiter(s) (50 mL/Hr) IV Continuous <Continuous>  dextrose 50% Injectable 25 Gram(s) IV Push once  dextrose 50% Injectable 12.5 Gram(s) IV Push once  dextrose 50% Injectable 25 Gram(s) IV Push once  enoxaparin Injectable 40 milliGRAM(s) SubCutaneous every 24 hours  glucagon  Injectable 1 milliGRAM(s) IntraMuscular once  insulin lispro (ADMELOG) corrective regimen sliding scale   SubCutaneous three times a day before meals  insulin lispro (ADMELOG) corrective regimen sliding scale   SubCutaneous at bedtime  methocarbamol 500 milliGRAM(s) Oral every 8 hours  metoprolol tartrate 25 milliGRAM(s) Oral daily  pantoprazole    Tablet 40 milliGRAM(s) Oral before breakfast  senna 2 Tablet(s) Oral at bedtime  sodium chloride 0.9%. 1000 milliLiter(s) (75 mL/Hr) IV Continuous <Continuous>    MEDICATIONS  (PRN):  acetaminophen     Tablet .. 650 milliGRAM(s) Oral every 6 hours PRN Temp greater or equal to 38C (100.4F), Mild Pain (1 - 3)  albuterol    90 MICROgram(s) HFA Inhaler 2 Puff(s) Inhalation every 6 hours PRN Bronchospasm  dextrose Oral Gel 15 Gram(s) Oral once PRN Blood Glucose LESS THAN 70 milliGRAM(s)/deciliter  ondansetron Injectable 4 milliGRAM(s) IV Push every 6 hours PRN Nausea and/or Vomiting  oxyCODONE    IR 5 milliGRAM(s) Oral every 4 hours PRN Moderate Pain (4 - 6)  oxyCODONE    IR 10 milliGRAM(s) Oral every 4 hours PRN Severe Pain (7 - 10)

## 2023-03-28 NOTE — PROGRESS NOTE ADULT - TIME BILLING
Pt stable. RUE pain and weakness resolved.  Has some dysphagia, but tolerating solid and liquid food.  Motor / senosry intact.  Hemovac DC'ed.  DC planning, possibly home tomorrow.

## 2023-03-29 ENCOUNTER — TRANSCRIPTION ENCOUNTER (OUTPATIENT)
Age: 53
End: 2023-03-29

## 2023-03-29 VITALS
RESPIRATION RATE: 18 BRPM | SYSTOLIC BLOOD PRESSURE: 124 MMHG | HEART RATE: 99 BPM | OXYGEN SATURATION: 93 % | DIASTOLIC BLOOD PRESSURE: 90 MMHG | TEMPERATURE: 98 F

## 2023-03-29 LAB
ANION GAP SERPL CALC-SCNC: 13 MMOL/L — SIGNIFICANT CHANGE UP (ref 5–17)
BUN SERPL-MCNC: 20 MG/DL — SIGNIFICANT CHANGE UP (ref 7–23)
CALCIUM SERPL-MCNC: 8.9 MG/DL — SIGNIFICANT CHANGE UP (ref 8.4–10.5)
CHLORIDE SERPL-SCNC: 103 MMOL/L — SIGNIFICANT CHANGE UP (ref 96–108)
CO2 SERPL-SCNC: 24 MMOL/L — SIGNIFICANT CHANGE UP (ref 22–31)
CREAT SERPL-MCNC: 0.76 MG/DL — SIGNIFICANT CHANGE UP (ref 0.5–1.3)
EGFR: 108 ML/MIN/1.73M2 — SIGNIFICANT CHANGE UP
GLUCOSE BLDC GLUCOMTR-MCNC: 106 MG/DL — HIGH (ref 70–99)
GLUCOSE BLDC GLUCOMTR-MCNC: 118 MG/DL — HIGH (ref 70–99)
GLUCOSE SERPL-MCNC: 120 MG/DL — HIGH (ref 70–99)
HCT VFR BLD CALC: 43.8 % — SIGNIFICANT CHANGE UP (ref 39–50)
HGB BLD-MCNC: 14.7 G/DL — SIGNIFICANT CHANGE UP (ref 13–17)
MCHC RBC-ENTMCNC: 30.7 PG — SIGNIFICANT CHANGE UP (ref 27–34)
MCHC RBC-ENTMCNC: 33.6 GM/DL — SIGNIFICANT CHANGE UP (ref 32–36)
MCV RBC AUTO: 91.4 FL — SIGNIFICANT CHANGE UP (ref 80–100)
NRBC # BLD: 0 /100 WBCS — SIGNIFICANT CHANGE UP (ref 0–0)
PLATELET # BLD AUTO: 234 K/UL — SIGNIFICANT CHANGE UP (ref 150–400)
POTASSIUM SERPL-MCNC: 4.2 MMOL/L — SIGNIFICANT CHANGE UP (ref 3.5–5.3)
POTASSIUM SERPL-SCNC: 4.2 MMOL/L — SIGNIFICANT CHANGE UP (ref 3.5–5.3)
RBC # BLD: 4.79 M/UL — SIGNIFICANT CHANGE UP (ref 4.2–5.8)
RBC # FLD: 13.2 % — SIGNIFICANT CHANGE UP (ref 10.3–14.5)
SODIUM SERPL-SCNC: 140 MMOL/L — SIGNIFICANT CHANGE UP (ref 135–145)
WBC # BLD: 20.83 K/UL — HIGH (ref 3.8–10.5)
WBC # FLD AUTO: 20.83 K/UL — HIGH (ref 3.8–10.5)

## 2023-03-29 PROCEDURE — 85014 HEMATOCRIT: CPT

## 2023-03-29 PROCEDURE — 82565 ASSAY OF CREATININE: CPT

## 2023-03-29 PROCEDURE — 97116 GAIT TRAINING THERAPY: CPT

## 2023-03-29 PROCEDURE — 82330 ASSAY OF CALCIUM: CPT

## 2023-03-29 PROCEDURE — 80048 BASIC METABOLIC PNL TOTAL CA: CPT

## 2023-03-29 PROCEDURE — 85027 COMPLETE CBC AUTOMATED: CPT

## 2023-03-29 PROCEDURE — 97161 PT EVAL LOW COMPLEX 20 MIN: CPT

## 2023-03-29 PROCEDURE — 82962 GLUCOSE BLOOD TEST: CPT

## 2023-03-29 PROCEDURE — 83605 ASSAY OF LACTIC ACID: CPT

## 2023-03-29 PROCEDURE — 84132 ASSAY OF SERUM POTASSIUM: CPT

## 2023-03-29 PROCEDURE — 97530 THERAPEUTIC ACTIVITIES: CPT

## 2023-03-29 PROCEDURE — C1769: CPT

## 2023-03-29 PROCEDURE — 82803 BLOOD GASES ANY COMBINATION: CPT

## 2023-03-29 PROCEDURE — 76000 FLUOROSCOPY <1 HR PHYS/QHP: CPT

## 2023-03-29 PROCEDURE — 82435 ASSAY OF BLOOD CHLORIDE: CPT

## 2023-03-29 PROCEDURE — C1713: CPT

## 2023-03-29 PROCEDURE — 84295 ASSAY OF SERUM SODIUM: CPT

## 2023-03-29 PROCEDURE — 85018 HEMOGLOBIN: CPT

## 2023-03-29 PROCEDURE — 82947 ASSAY GLUCOSE BLOOD QUANT: CPT

## 2023-03-29 PROCEDURE — 97165 OT EVAL LOW COMPLEX 30 MIN: CPT

## 2023-03-29 PROCEDURE — C1889: CPT

## 2023-03-29 RX ORDER — ACETAMINOPHEN 500 MG
2 TABLET ORAL
Qty: 0 | Refills: 0 | DISCHARGE

## 2023-03-29 RX ORDER — DEXAMETHASONE 0.5 MG/5ML
4 ELIXIR ORAL ONCE
Refills: 0 | Status: DISCONTINUED | OUTPATIENT
Start: 2023-03-29 | End: 2023-03-29

## 2023-03-29 RX ORDER — ALBUTEROL 90 UG/1
0 AEROSOL, METERED ORAL
Qty: 0 | Refills: 0 | DISCHARGE

## 2023-03-29 RX ORDER — SENNA PLUS 8.6 MG/1
2 TABLET ORAL
Qty: 0 | Refills: 0 | DISCHARGE
Start: 2023-03-29

## 2023-03-29 RX ORDER — ACETAMINOPHEN 500 MG
2 TABLET ORAL
Qty: 0 | Refills: 0 | DISCHARGE
Start: 2023-03-29

## 2023-03-29 RX ORDER — DEXAMETHASONE 0.5 MG/5ML
4 ELIXIR ORAL EVERY 8 HOURS
Refills: 0 | Status: DISCONTINUED | OUTPATIENT
Start: 2023-03-29 | End: 2023-03-29

## 2023-03-29 RX ORDER — BENZOCAINE AND MENTHOL 5; 1 G/100ML; G/100ML
1 LIQUID ORAL EVERY 6 HOURS
Refills: 0 | Status: DISCONTINUED | OUTPATIENT
Start: 2023-03-29 | End: 2023-03-29

## 2023-03-29 RX ORDER — ALBUTEROL 90 UG/1
2 AEROSOL, METERED ORAL
Qty: 0 | Refills: 0 | DISCHARGE
Start: 2023-03-29

## 2023-03-29 RX ADMIN — PANTOPRAZOLE SODIUM 40 MILLIGRAM(S): 20 TABLET, DELAYED RELEASE ORAL at 05:20

## 2023-03-29 RX ADMIN — METHOCARBAMOL 500 MILLIGRAM(S): 500 TABLET, FILM COATED ORAL at 05:19

## 2023-03-29 RX ADMIN — Medication 4 MILLIGRAM(S): at 02:36

## 2023-03-29 RX ADMIN — Medication 25 MILLIGRAM(S): at 05:20

## 2023-03-29 RX ADMIN — Medication 650 MILLIGRAM(S): at 17:29

## 2023-03-29 RX ADMIN — POLYETHYLENE GLYCOL 3350 17 GRAM(S): 17 POWDER, FOR SOLUTION ORAL at 05:20

## 2023-03-29 RX ADMIN — Medication 4 MILLIGRAM(S): at 13:07

## 2023-03-29 NOTE — PROGRESS NOTE ADULT - REASON FOR ADMISSION
Patient was admitted for elective cervical discectomy due to h/o right upper extremity pain and neck weakness.

## 2023-03-29 NOTE — DISCHARGE NOTE PROVIDER - HOSPITAL COURSE
Patient went to OR on 3/27 and had C4-C6 anterior cervical discectomy and fusion done. Post procedure patient was moved to recovery room and was monitored. Patient was moved to floor once stable. On the floor patient's Gleason was removed and he voided. Patient's surgical drain was removed on 3/28. Patient complained of pain after swallowing so 48 hours of decadron was given. Patient tolerated diet and had no other complications. Patient was discharge home with follow up instructions.

## 2023-03-29 NOTE — DISCHARGE NOTE PROVIDER - REASON FOR ADMISSION
Patient was admitted with right upper extremity pain and h/o neck weakness. Patient came in for elective cervical discectomy and fusion.

## 2023-03-29 NOTE — DISCHARGE NOTE PROVIDER - NSDCCPCAREPLAN_GEN_ALL_CORE_FT
PRINCIPAL DISCHARGE DIAGNOSIS  Diagnosis: Cervical herniated disc  Assessment and Plan of Treatment: s/p c4-c6 Anterior cervical discectomy and fusion on 3/27      SECONDARY DISCHARGE DIAGNOSES  Diagnosis: Pain with swallowing  Assessment and Plan of Treatment: gave decadron for 48 hours

## 2023-03-29 NOTE — DISCHARGE NOTE PROVIDER - NSDCMRMEDTOKEN_GEN_ALL_CORE_FT
acetaminophen 325 mg oral tablet: 2 tab(s) orally every 6 hours As needed Temp greater or equal to 38C (100.4F), Mild Pain (1 - 3)  albuterol 90 mcg/inh inhalation aerosol: 2 puff(s) inhaled every 6 hours As needed Bronchospasm  metoprolol tartrate 25 mg oral tablet: 1 tab(s) orally once a day  Ozempic 2 mg/1.5 mL (0.25 mg or 0.5 mg dose) subcutaneous solution: subcutaneous once a week  senna leaf extract oral tablet: 2 tab(s) orally once a day (at bedtime)

## 2023-03-29 NOTE — DISCHARGE NOTE PROVIDER - NSDCFUADDINST_GEN_ALL_CORE_FT
-May take shower 4 days after surgery. Let water run over surgical site and pat dry after shower.   -Please remove surgical site dressing on 3/30 and leave incision open to air.   -Please do not put any cream or lotion on the surgical incision.   med side effects     You have been started on a new medication, oxycodone.  Oxycodone helps to control pain. Oxycodone is an additive medication so it is important to limit the use of this medication.  Side effects include nausea, vomiting, constipation, dry mouth, lightheadedness, dizziness or drowsiness.  It is important to tell your physician if you experience any of these side effects.    You are on robaxin.  Robaxin is a muscle relaxtant  Side effects of robaxin: tiredness, drowyness

## 2023-03-29 NOTE — PROGRESS NOTE ADULT - SUBJECTIVE AND OBJECTIVE BOX
SUBJECTIVE:   Patient was seen and evaluated at bedside. Patient is resting in bed and is in no new acute distress.   OVERNIGHT EVENTS:   none   Vital Signs Last 24 Hrs  T(C): 36.4 (29 Mar 2023 08:51), Max: 36.6 (28 Mar 2023 12:23)  T(F): 97.5 (29 Mar 2023 08:51), Max: 97.8 (28 Mar 2023 12:23)  HR: 72 (29 Mar 2023 08:51) (72 - 106)  BP: 132/82 (29 Mar 2023 08:51) (111/71 - 138/82)  BP(mean): --  RR: 18 (29 Mar 2023 08:51) (16 - 18)  SpO2: 93% (29 Mar 2023 08:51) (92% - 94%)    Parameters below as of 29 Mar 2023 08:51  Patient On (Oxygen Delivery Method): room air        PHYSICAL EXAM:    General: No Acute Distress     Neurological: Awake, alert oriented to person, place and time, Following Commands, PERRL, EOMI, Face Symmetrical, Speech Fluent, Moving all extremities, Muscle Strength normal in all four extremities, No Drift, Sensation to Light Touch Intact    Pulmonary: Clear to Auscultation, No Rales, No Rhonchi, No Wheezes     Cardiovascular: S1, S2, Regular Rate and Rhythm     Gastrointestinal: Soft, Nontender, Nondistended     Incision: clean and dry     LABS:                        14.7   20.83 )-----------( 234      ( 29 Mar 2023 06:17 )             43.8    03-29    140  |  103  |  20  ----------------------------<  120<H>  4.2   |  24  |  0.76    Ca    8.9      29 Mar 2023 06:17          03-28 @ 07:01  -  03-29 @ 07:00  --------------------------------------------------------  IN: 1860 mL / OUT: 1680 mL / NET: 180 mL      DRAINS:     MEDICATIONS:  Antibiotics:    Neuro:  acetaminophen     Tablet .. 650 milliGRAM(s) Oral every 6 hours PRN Temp greater or equal to 38C (100.4F), Mild Pain (1 - 3)  methocarbamol 500 milliGRAM(s) Oral every 8 hours  ondansetron Injectable 4 milliGRAM(s) IV Push every 6 hours PRN Nausea and/or Vomiting  oxyCODONE    IR 5 milliGRAM(s) Oral every 4 hours PRN Moderate Pain (4 - 6)  oxyCODONE    IR 10 milliGRAM(s) Oral every 4 hours PRN Severe Pain (7 - 10)    Cardiac:  metoprolol tartrate 25 milliGRAM(s) Oral daily    Pulm:  albuterol    90 MICROgram(s) HFA Inhaler 2 Puff(s) Inhalation every 6 hours PRN Bronchospasm    GI/:  bisacodyl 5 milliGRAM(s) Oral at bedtime  pantoprazole    Tablet 40 milliGRAM(s) Oral before breakfast  polyethylene glycol 3350 17 Gram(s) Oral two times a day  senna 2 Tablet(s) Oral at bedtime    Other:   chlorhexidine 2% Cloths 1 Application(s) Topical once  dexAMETHasone     Tablet 4 milliGRAM(s) Oral every 8 hours  dextrose 5%. 1000 milliLiter(s) IV Continuous <Continuous>  dextrose 5%. 1000 milliLiter(s) IV Continuous <Continuous>  dextrose 50% Injectable 25 Gram(s) IV Push once  dextrose 50% Injectable 12.5 Gram(s) IV Push once  dextrose 50% Injectable 25 Gram(s) IV Push once  dextrose Oral Gel 15 Gram(s) Oral once PRN Blood Glucose LESS THAN 70 milliGRAM(s)/deciliter  enoxaparin Injectable 40 milliGRAM(s) SubCutaneous every 24 hours  glucagon  Injectable 1 milliGRAM(s) IntraMuscular once  insulin lispro (ADMELOG) corrective regimen sliding scale   SubCutaneous three times a day before meals  insulin lispro (ADMELOG) corrective regimen sliding scale   SubCutaneous at bedtime  sodium chloride 0.9%. 1000 milliLiter(s) IV Continuous <Continuous>    DIET: [] Regular [] CCD [] Renal [] Puree [] Dysphagia [] Tube Feeds:   ccd diet   IMAGING:   no new imaging today

## 2023-03-29 NOTE — PROGRESS NOTE ADULT - ASSESSMENT
Patient seen and examined at bedside s/p C4-6 anterior cervical discectomy and fusion, recovering well.  -PACU Floor, cleared after 6 hours.  -Q4 neurochecks  -Q4 vitals  -Pain control  -Advance diet as tolerated  -PT/OT  -Dex 4q6
53 YO M PMH prediabetes, newly diagnosed HTN (recently started on daily metoprolol), asthma (has not used rescue inhaler for >1 year), DANIELA (by criteria, Stop Bang 5), chronic Hep C (treated 20+ years ago), pt works as a  and states on 1/17/2023 he was moving an 80 pound bag of cement and placed it on his right shoulder and immediately began experiencing right arm pain that progressed to neck pain, right  weakness and numbness, MRI revealed large C5-6 disc herniation and smaller C4-5 disc herniation with significant cord compression, possible osteophyte formation, presents to PST for C4-C8 ANTERIOR CERVICAL DISCECTOMY AND FUSION 3/27/2023. Denies any palpitations, SOB, N/V, fever or chills.      ***Covid test scheduled for 3/24/2023 at [facility]. (22 Mar 2023 16:59)    PROCEDURE: Adm 3/27 Anterior cervical laminectomy with fusion C4-6    POD#1    PLAN:  Neuro: 3/28 HMV Drain DC'd this AM. Dex decreased to 4mg Q8h to complete 48hrs-some throat discomfort. Ck CBC, BMP AM. 3/28 Gleason DC'd to TOV, IVL after voiding.  Inc activity/OOB. PT/OT eval-P, plan for DC Home in AM as d/w pt and Dr Bautista.    Respiratory: Patient instructed to use incentive spirometer [ X] YES [ ] NO              DVT ppx: [X ] SQL [ ] SQH and Venodynes [ ] Left [ ] Right [ X] Bilateral    Discharge Planning:  PT/OT eval-P FU.  He was subsequently DC on >>> in stable condition.    More than 30 minutes spent on total encounter: more than 50% of the visit was spent on educating the patient and family regarding condition, medications, follow up plans, signs and symptoms to be concerned with, preparing paperwork, and questions answered regarding discharge.      
HPI:  51 YO M PMH prediabetes, newly diagnosed HTN (recently started on daily metoprolol), asthma (has not used rescue inhaler for >1 year), DANIELA (by criteria, Stop Bang 5), chronic Hep C (treated 20+ years ago), pt works as a  and states on 1/17/2023 he was moving an 80 pound bag of cement and placed it on his right shoulder and immediately began experiencing right arm pain that progressed to neck pain, right  weakness and numbness, MRI revealed large C5-6 disc herniation and smaller C4-5 disc herniation with significant cord compression, possible osteophyte formation, presents to PST for C4-C8 ANTERIOR CERVICAL DISCECTOMY AND FUSION 3/27/2023. Denies any palpitations, SOB, N/V, fever or chills.      ***Covid test scheduled for 3/24/2023 at [facility]. (22 Mar 2023 16:59)    PROCEDURE: Anterior cervical laminectomy with fusion    Anterior cervical discectomy with fusion  s/o C4-C6 ACDF on 3/27      POD#  2   PLAN:  Neuro:   1 Out of bed   2 pt/ot   3 decadron 4q8 to help with pain and swallow   4 robaxin for muscle spasm     Respiratory:   1 room air   2 incentive spirometry     CV:  1 blood pressure stable   2 continue metoprolol     Endocrine:   1 stable   2 on sliding scale     Heme/Onc:             DVT ppx: sql and scsds   1 stable     Renal:   1 voiding   2 iv lock     ID:   1 afebrile   2 leukocytosis -likely from steroid     GI:   1 ccd diet   2 senna and miralax   3 protonix.     Social/Family:   Discharge planning: dc home likely in am     -will discuss with Dr. Bautista   -[phone]

## 2023-03-29 NOTE — DISCHARGE NOTE NURSING/CASE MANAGEMENT/SOCIAL WORK - PATIENT PORTAL LINK FT
You can access the FollowMyHealth Patient Portal offered by [facility] by registering at the following website: [email or web address]. By joining [facility]’s FollowMyHealth portal, you will also be able to view your health information using other applications (apps) compatible with our system.

## 2023-03-29 NOTE — DISCHARGE NOTE PROVIDER - CARE PROVIDER_API CALL
Leonides Bautista)  Neurosurgery  [address] [place] [address], [address]  [place] [address]  Phone: [phone]  Fax: [phone]  Follow Up Time: 1 week

## 2023-06-08 PROBLEM — I10 ESSENTIAL (PRIMARY) HYPERTENSION: Chronic | Status: ACTIVE | Noted: 2023-03-22

## 2023-06-08 PROBLEM — J45.909 UNSPECIFIED ASTHMA, UNCOMPLICATED: Chronic | Status: ACTIVE | Noted: 2023-03-22

## 2023-06-08 PROBLEM — M50.20 OTHER CERVICAL DISC DISPLACEMENT, UNSPECIFIED CERVICAL REGION: Chronic | Status: ACTIVE | Noted: 2023-03-22

## 2023-06-08 PROBLEM — U07.1 COVID-19: Chronic | Status: ACTIVE | Noted: 2023-03-22

## 2023-06-08 PROBLEM — Z87.898 PERSONAL HISTORY OF OTHER SPECIFIED CONDITIONS: Chronic | Status: ACTIVE | Noted: 2023-03-22

## 2023-06-08 PROBLEM — Z87.19 PERSONAL HISTORY OF OTHER DISEASES OF THE DIGESTIVE SYSTEM: Chronic | Status: ACTIVE | Noted: 2023-03-22

## 2023-06-08 PROBLEM — G47.33 OBSTRUCTIVE SLEEP APNEA (ADULT) (PEDIATRIC): Chronic | Status: ACTIVE | Noted: 2023-03-22

## 2023-07-17 NOTE — PROGRESS NOTE ADULT - SUBJECTIVE AND OBJECTIVE BOX
17-Jul-2023 Patient seen and examined s/p C4-6 anterior cervical discectomy and fusion.     AOx3, no drift, FIERRO 5/5, SILT. No clonus or Kaur's    T(C): 36 (03-27-23 @ 14:30), Max: 36.4 (03-27-23 @ 08:30)  HR: 122 (03-27-23 @ 17:30) (82 - 122)  BP: 115/81 (03-27-23 @ 17:30) (115/81 - 133/84)  RR: 14 (03-27-23 @ 17:30) (12 - 16)  SpO2: 95% (03-27-23 @ 17:30) (95% - 100%)                    CAPILLARY BLOOD GLUCOSE      POCT Blood Glucose.: 119 mg/dL (27 Mar 2023 15:34)  POCT Blood Glucose.: 107 mg/dL (27 Mar 2023 08:08)

## 2023-07-25 ENCOUNTER — RESULT REVIEW (OUTPATIENT)
Age: 53
End: 2023-07-25

## 2023-07-25 ENCOUNTER — OUTPATIENT (OUTPATIENT)
Dept: OUTPATIENT SERVICES | Facility: HOSPITAL | Age: 53
LOS: 1 days | End: 2023-07-25
Payer: COMMERCIAL

## 2023-07-25 ENCOUNTER — APPOINTMENT (OUTPATIENT)
Dept: CT IMAGING | Facility: IMAGING CENTER | Age: 53
End: 2023-07-25
Payer: OTHER MISCELLANEOUS

## 2023-07-25 DIAGNOSIS — M50.20 OTHER CERVICAL DISC DISPLACEMENT, UNSPECIFIED CERVICAL REGION: ICD-10-CM

## 2023-07-25 DIAGNOSIS — Z98.890 OTHER SPECIFIED POSTPROCEDURAL STATES: Chronic | ICD-10-CM

## 2023-07-25 PROCEDURE — 72125 CT NECK SPINE W/O DYE: CPT | Mod: 26

## 2023-07-25 PROCEDURE — 76377 3D RENDER W/INTRP POSTPROCES: CPT

## 2023-07-25 PROCEDURE — 76377 3D RENDER W/INTRP POSTPROCES: CPT | Mod: 26

## 2023-07-25 PROCEDURE — 72125 CT NECK SPINE W/O DYE: CPT

## 2024-04-02 NOTE — H&P PST ADULT - OPHTHALMOLOGIC
Appt is confirmed for 4/24/2024 at 4:30pm. Pt will like for writer to reach out if earlier time come available .    negative

## 2024-06-10 NOTE — OCCUPATIONAL THERAPY INITIAL EVALUATION ADULT - LEVEL OF INDEPENDENCE: SUPINE/SIT, REHAB EVAL
The anesthetics, sedatives or narcotics which were given to you today will be acting in your body for the next 24 hours, so you might feel a little sleepy or groggy. This feeling should slowly wear off.   Carefully read and follow the instructions below:   You received sedation today.   Do not drive or operate machinery or power tools of any kind.   No alcoholic beverages today, not even beer or wine.   No over the counter medications that contain alcohol or may cause drowsiness.   Do not make important decisions or sign legal documents.     Do not use Aspirin containing products or non-steroidal medications for the next 24 hours.  (Examples of these types of medications include: Advil, Aleve, Ecotrin, Ibuprofen, Motrin or Naprosyn.  This list is not all-inclusive.  Check with your physician or pharmacist before resuming these medications.)  Tylenol, cough medicine, cough drops or throat lozenges may be used when you are allowed to resume eating and drinking.     Call your physician if any of these symptoms occur:   High fever over 101 degrees or chills (a low grade fever is common for 24 hours)   Rash or hives   Persistent nausea or vomiting   Inability to urinate within 8 hours after the procedure  Go directly to the emergency room if you notice any of the following:   Shortness of breath   Chest pain  Coughing up large amounts of bright red blood greater than a teaspoonful of blood clots (about a teaspoonful for the next 24-48 hours is normal, especially if you had a biopsy)  Resume all normal medications unless directed otherwise by your doctor.      Follow up with your referring physician as previously scheduled.    If you experience any problems or have any questions following discharge, please call:   Before 5 pm: [phone]   After 5pm and on weekends: [phone] / [phone] and ask for the Pulmonary Fellow on-call (Pager Number: [phone])    contact guard

## 2024-09-20 NOTE — H&P PST ADULT - PROBLEM/PLAN-2
Patient met sepsis criteria, present on admission as evident by leukocytosis, tachypnea and tachycardia, arrival temp was 100.4 °F  Secondary to an acute cystitis without hematuria  SIRS criteria have significantly improved, patient is no longer tachycardic, and/or febrile  Status post 3 days worth of IV ceftriaxone-treatment was in place for suspected UTI  Appreciate ID input  Plan is to monitor off of antibiotics   DISPLAY PLAN FREE TEXT

## (undated) DEVICE — Device

## (undated) DEVICE — POSITIONER FOAM EGG CRATE ULNAR 2PCS (PINK)

## (undated) DEVICE — DRSG STERISTRIPS 0.5 X 4"

## (undated) DEVICE — DRSG MASTISOL

## (undated) DEVICE — ELCTR BOVIE TIP BLADE INSULATED 2.75" EDGE

## (undated) DEVICE — DRSG TAPE HYPAFIX 4"

## (undated) DEVICE — ELCTR SUBDERMAL NDL 27G X 1/2" WITH TWISTED PAIR

## (undated) DEVICE — SUT ETHILON 3-0 30" FS-1

## (undated) DEVICE — DRSG TELFA 3 X 8

## (undated) DEVICE — VENODYNE/SCD SLEEVE CALF LARGE

## (undated) DEVICE — SUT SOFSILK 2-0 18" TIES

## (undated) DEVICE — PREP CHLORAPREP HI-LITE ORANGE 26ML

## (undated) DEVICE — DRSG DERMABOND PRINEO 22CM

## (undated) DEVICE — BIPOLAR FORCEP SYMMETRY BAYONET 7" X 1.5MM SMOOTH (SILVER)

## (undated) DEVICE — GLV 8 PROTEXIS (WHITE)

## (undated) DEVICE — ELCTR SUBDERMAL NDL CLASSIC 1.5M X 59" (6 COLOR)

## (undated) DEVICE — DRAPE EQUIPMENT COVER 27"

## (undated) DEVICE — DRAPE TOWEL BLUE 17" X 24"

## (undated) DEVICE — DRAPE 1/2 SHEET 40X57"

## (undated) DEVICE — NDL SPINAL 18G X 3.5" (PINK)

## (undated) DEVICE — GLV 7 PROTEXIS (WHITE)

## (undated) DEVICE — SPECIMEN CONTAINER 100ML

## (undated) DEVICE — SOL IRR POUR H2O 250ML

## (undated) DEVICE — SUT BIOSYN 4-0 18" P-12

## (undated) DEVICE — MIDAS REX MR7 LUBRICANT DIFFUSER CARTRIDGE

## (undated) DEVICE — SYR LUER LOK 20CC

## (undated) DEVICE — DRAIN JACKSON PRATT 3 SPRING RESERVOIR W 10FR PVC DRAIN

## (undated) DEVICE — DRAPE MAYO STAND 30"

## (undated) DEVICE — GLV 7.5 PROTEXIS (WHITE)

## (undated) DEVICE — PACK LUMBAR LAMI

## (undated) DEVICE — CANISTER SUCTION 2000CC

## (undated) DEVICE — SUT VICRYL 3-0 18" X-1 (POP-OFF)

## (undated) DEVICE — ELCTR SUBDERMAL CORKSCREW NDL 1.2MM

## (undated) DEVICE — GLV 6.5 PROTEXIS (WHITE)

## (undated) DEVICE — MARKING PEN W RULER

## (undated) DEVICE — SPONGE PEANUT AUTO COUNT

## (undated) DEVICE — MIDAS REX MR8 MATCH HEAD FLUTED LG BORE 3MM X 14CM

## (undated) DEVICE — SOL IRR POUR NS 0.9% 500ML

## (undated) DEVICE — FOLEY TRAY 16FR 5CC LTX UMETER CLOSED

## (undated) DEVICE — ELCTR 4-DISC 20MM 49" (RED, BLUE, GREEN, BLACK)

## (undated) DEVICE — STAPLER SKIN VISI-STAT 35 WIDE

## (undated) DEVICE — DRAPE 3/4 SHEET W REINFORCEMENT 56X77"

## (undated) DEVICE — WARMING BLANKET LOWER ADULT